# Patient Record
Sex: MALE
[De-identification: names, ages, dates, MRNs, and addresses within clinical notes are randomized per-mention and may not be internally consistent; named-entity substitution may affect disease eponyms.]

---
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
## 2020-12-16 PROBLEM — Z11.1 ENCOUNTER FOR PPD SKIN TEST READING: Status: RESOLVED | Noted: 2018-09-29 | Resolved: 2020-12-16

## 2020-12-21 PROBLEM — Z87.09 HISTORY OF SORE THROAT: Status: RESOLVED | Noted: 2019-06-18 | Resolved: 2020-12-21

## 2021-06-19 ENCOUNTER — APPOINTMENT (OUTPATIENT)
Dept: PEDIATRICS | Facility: CLINIC | Age: 21
End: 2021-06-19
Payer: OTHER GOVERNMENT

## 2021-06-19 VITALS — TEMPERATURE: 99 F | WEIGHT: 225.5 LBS

## 2021-06-19 DIAGNOSIS — J32.9 CHRONIC SINUSITIS, UNSPECIFIED: ICD-10-CM

## 2021-06-19 DIAGNOSIS — J01.00 ACUTE MAXILLARY SINUSITIS, UNSPECIFIED: ICD-10-CM

## 2021-06-19 PROCEDURE — 99213 OFFICE O/P EST LOW 20 MIN: CPT

## 2021-06-19 RX ORDER — FLUTICASONE PROPIONATE 50 UG/1
50 SPRAY, METERED NASAL TWICE DAILY
Qty: 1 | Refills: 1 | Status: ACTIVE | COMMUNITY
Start: 2021-06-19 | End: 1900-01-01

## 2021-06-19 RX ORDER — AZITHROMYCIN 250 MG/1
250 TABLET, FILM COATED ORAL
Qty: 1 | Refills: 0 | Status: COMPLETED | COMMUNITY
Start: 2019-04-26 | End: 2021-06-19

## 2021-06-19 RX ORDER — AZITHROMYCIN 500 MG/1
500 TABLET, FILM COATED ORAL DAILY
Qty: 5 | Refills: 0 | Status: ACTIVE | COMMUNITY
Start: 2021-06-19 | End: 1900-01-01

## 2021-06-19 RX ORDER — LEVOCETIRIZINE DIHYDROCHLORIDE 5 MG/1
5 TABLET ORAL
Qty: 30 | Refills: 0 | Status: ACTIVE | COMMUNITY
Start: 2021-06-19 | End: 1900-01-01

## 2021-06-19 RX ORDER — AMOXICILLIN 500 MG/1
500 TABLET, FILM COATED ORAL
Qty: 20 | Refills: 0 | Status: COMPLETED | COMMUNITY
Start: 2019-06-18 | End: 2021-06-19

## 2021-06-19 NOTE — DISCUSSION/SUMMARY
[FreeTextEntry1] : 21yo c/o chest pain only w cough\par cough x 3-4 days productive of green/yellow phlegm\par watery RR, nasal congestion\par bifrontal HA\par PE appears less than well, deep cough\par watery RR\par MP PND \par Chest CTA, unlabored resp, no W/R/R\par elect to Rx W Azithromycin, Fluticasone, Levocetirizine\par If symptoms worsen or concerned, call/return to office.\par Questions answered.\par

## 2021-06-19 NOTE — PHYSICAL EXAM
[Clear Rhinorrhea] : clear rhinorrhea [Nonerythematous Oropharynx] : nonerythematous oropharynx [Nontender Cervical Lymph Nodes] : nontender cervical lymph nodes [Clear to Auscultation Bilaterally] : clear to auscultation bilaterally [Soft] : soft [Paul: ____] : Paul [unfilled] [Circumcised] : circumcised [FreeTextEntry2] : bi frontal HA [FreeTextEntry1] : appears ill, coughing [FreeTextEntry4] : nasal congestion [de-identified] : LISBET ROCA [FreeTextEntry7] : unlabored resp, No W/R/R

## 2021-06-19 NOTE — HISTORY OF PRESENT ILLNESS
[FreeTextEntry6] : 19 yo called yesterday to c/o chest pain, made appointment for today\par chest only hurts when coughing\par no fever nasal congestion, watery nasal discharge\par coughing productive of yellow phlegm\par has not been seen in several years, need update in immunizations when well

## 2021-06-19 NOTE — REVIEW OF SYSTEMS
[Nasal Discharge] : nasal discharge [Nasal Congestion] : nasal congestion [Sore Throat] : sore throat [Sinus Pressure] : sinus pressure [Cough] : cough [Negative] : Genitourinary [Fever] : no fever

## 2021-06-23 ENCOUNTER — APPOINTMENT (OUTPATIENT)
Dept: PEDIATRICS | Facility: CLINIC | Age: 21
End: 2021-06-23

## 2021-06-23 NOTE — HISTORY OF PRESENT ILLNESS
[Yes] : Patient goes to dentist yearly [de-identified] : self [FreeTextEntry1] : 19 yo for HM visit, immunization\par \par \par \par \par \par \par

## 2021-06-25 ENCOUNTER — APPOINTMENT (OUTPATIENT)
Dept: PEDIATRICS | Facility: CLINIC | Age: 21
End: 2021-06-25

## 2025-07-03 ENCOUNTER — EMERGENCY (EMERGENCY)
Facility: HOSPITAL | Age: 25
LOS: 1 days | End: 2025-07-03
Attending: STUDENT IN AN ORGANIZED HEALTH CARE EDUCATION/TRAINING PROGRAM
Payer: SELF-PAY

## 2025-07-03 VITALS
RESPIRATION RATE: 18 BRPM | SYSTOLIC BLOOD PRESSURE: 111 MMHG | HEIGHT: 74 IN | DIASTOLIC BLOOD PRESSURE: 75 MMHG | WEIGHT: 220.02 LBS | OXYGEN SATURATION: 97 % | HEART RATE: 79 BPM | TEMPERATURE: 99 F

## 2025-07-03 LAB
FLUAV AG NPH QL: SIGNIFICANT CHANGE UP
FLUBV AG NPH QL: SIGNIFICANT CHANGE UP
RSV RNA NPH QL NAA+NON-PROBE: SIGNIFICANT CHANGE UP
SARS-COV-2 RNA SPEC QL NAA+PROBE: SIGNIFICANT CHANGE UP
SOURCE RESPIRATORY: SIGNIFICANT CHANGE UP

## 2025-07-03 PROCEDURE — 71046 X-RAY EXAM CHEST 2 VIEWS: CPT | Mod: 26

## 2025-07-03 PROCEDURE — 87637 SARSCOV2&INF A&B&RSV AMP PRB: CPT

## 2025-07-03 PROCEDURE — 99284 EMERGENCY DEPT VISIT MOD MDM: CPT

## 2025-07-03 PROCEDURE — 99283 EMERGENCY DEPT VISIT LOW MDM: CPT | Mod: 25

## 2025-07-03 PROCEDURE — 71046 X-RAY EXAM CHEST 2 VIEWS: CPT

## 2025-07-03 PROCEDURE — 0241U: CPT

## 2025-07-03 NOTE — ED PROVIDER NOTE - PATIENT PORTAL LINK FT
You can access the FollowMyHealth Patient Portal offered by [facility] by registering at the following website: [email or web address]. By joining [employer]’s FollowMyHealth portal, you will also be able to view your health information using other applications (apps) compatible with our system.

## 2025-07-03 NOTE — ED ADULT NURSE NOTE - OBJECTIVE STATEMENT
Assumed care of pt at 1605 in . Pt A&Ox4 c/o N/V/D/abdominal pain/fever/cough/chills/body aches, was started on Azithromycin after being diagnosed with pneumonia, pt resting comfortably showing no signs of respiratory distress or pain, pt is calm and cooperative, Tylenol/Motrin taken

## 2025-07-03 NOTE — ED ADULT NURSE NOTE - CHIEF COMPLAINT QUOTE
C/O flu like symptoms x3 days. Pt states he has body aches, vomiting, diarrhea, fevers, cough. "I live on [facility] and I went to urgent care and was started on an antibiotic"

## 2025-07-03 NOTE — ED ADULT TRIAGE NOTE - CHIEF COMPLAINT QUOTE
C/O flu like symptoms x3 days. Pt states he has body aches, vomiting, diarrhea, fevers, cough. "I live on [facility] and I went to urgent care and was started on an antibiotic"

## 2025-07-03 NOTE — ED PROVIDER NOTE - CLINICAL SUMMARY MEDICAL DECISION MAKING FREE TEXT BOX
24-year-old male presents ED complaining of cough/congestion, fever, diarrhea/vomiting, body aches x 3 days. Chest x-ray reviewed–no acute pathology.  Patient symptoms consistent with viral illness.  Patient stable for discharge with PCP follow-up.

## 2025-07-03 NOTE — ED ADULT NURSE NOTE - MODE OF DISCHARGE
Attempt # [phone] to [phone]    Left non-detailed VM for patient to call back and speak with any triage nurse.    AUGUST Cowan, RN, PHN  [facility]     Ambulatory

## 2025-07-03 NOTE — ED PROVIDER NOTE - ATTENDING APP SHARED VISIT CONTRIBUTION OF CARE
24 YOM with cough/congestion, fever for 3 days.  Patient [place] to [place] care [place] [address] and started antibiotic for presumed pneumonia. nontoxic appearing. treat supportively